# Patient Record
Sex: FEMALE | URBAN - METROPOLITAN AREA
[De-identification: names, ages, dates, MRNs, and addresses within clinical notes are randomized per-mention and may not be internally consistent; named-entity substitution may affect disease eponyms.]

---

## 2018-11-21 ENCOUNTER — APPOINTMENT (RX ONLY)
Dept: URBAN - METROPOLITAN AREA CLINIC 35 | Facility: CLINIC | Age: 48
Setting detail: DERMATOLOGY
End: 2018-11-21

## 2018-11-21 DIAGNOSIS — Z41.9 ENCOUNTER FOR PROCEDURE FOR PURPOSES OTHER THAN REMEDYING HEALTH STATE, UNSPECIFIED: ICD-10-CM

## 2018-11-21 PROCEDURE — ? ADDITIONAL NOTES

## 2018-11-21 PROCEDURE — ? FILLERS

## 2018-11-21 PROCEDURE — ? BOTOX

## 2018-11-21 NOTE — PROCEDURE: BOTOX
Additional Area 2 Units: 38
Consent: Written consent obtained. Risks include but not limited to lid/brow ptosis or drooping, bruising, swelling, diplopia/double vision, temporary effect, incomplete chemical denervation/relaxation of muscles.
Inferior Lateral Orbicularis Oculi Units: 0
Additional Area 6 Location: platysma
Reconstitution Date (Optional): 11/21/2018
Additional Area 2 Location: Crows Feet
Price (Use Numbers Only, No Special Characters Or $): 998
Dilution (U/0.1 Cc): 5
Additional Area 3 Units: 12
Lot #: [id number]
Additional Area 3 Location: Glabella
Post-Care Instructions: Patient instructed to not lie down for 4 hours after injections and limit physical activity for 24 hours.
Expiration Date (Month Year): 10/2020
Detail Level: Detailed
Additional Area 5 Location: perioral
Additional Area 1 Location: Frontalis
Additional Area 4 Location: Bunny lines

## 2019-04-04 ENCOUNTER — APPOINTMENT (RX ONLY)
Dept: URBAN - METROPOLITAN AREA CLINIC 35 | Facility: CLINIC | Age: 49
Setting detail: DERMATOLOGY
End: 2019-04-04

## 2019-04-04 DIAGNOSIS — Z41.9 ENCOUNTER FOR PROCEDURE FOR PURPOSES OTHER THAN REMEDYING HEALTH STATE, UNSPECIFIED: ICD-10-CM

## 2019-04-04 PROCEDURE — ? ADDITIONAL NOTES

## 2019-04-04 PROCEDURE — ? BOTOX

## 2019-04-04 NOTE — PROCEDURE: BOTOX
Additional Area 1 Units: 12
Additional Area 3 Units: 0
Expiration Date (Month Year): 01/2021
Price (Use Numbers Only, No Special Characters Or $): 778
Lot #: [id number]
Additional Area 4 Location: Bunny lines
Additional Area 6 Location: platysma
Reconstitution Date (Optional): 4/4/2019
Consent: Verbal and written informed consent were obtained to include the following risks: pain, swelling, bruising, eyelid or eyebrow droop, and lack of visible improvement of wrinkles in the areas treated.  The skin was cleansed with alcohol. Injections were administered with a 32g needle into the following areas:
Detail Level: Detailed
Dilution (U/0.1 Cc): 5
Additional Area 2 Units: 38
Additional Area 2 Location: Crows Feet
Additional Area 3 Location: Frontalis
Post-Care Instructions: Patient instructed to not lie down for 4 hours after injections and limit physical activity for 24 hours.
Additional Area 5 Location: perioral
Additional Area 1 Location: Glabella

## 2019-08-01 ENCOUNTER — APPOINTMENT (RX ONLY)
Dept: URBAN - METROPOLITAN AREA CLINIC 35 | Facility: CLINIC | Age: 49
Setting detail: DERMATOLOGY
End: 2019-08-01

## 2019-08-01 DIAGNOSIS — Z41.9 ENCOUNTER FOR PROCEDURE FOR PURPOSES OTHER THAN REMEDYING HEALTH STATE, UNSPECIFIED: ICD-10-CM

## 2019-08-01 PROCEDURE — ? ADDITIONAL NOTES

## 2019-08-01 PROCEDURE — ? BOTOX

## 2019-08-01 NOTE — PROCEDURE: BOTOX
Depressor Anguli Oris Units: 0
Lot #: [id number]
Additional Area 3 Location: Frontalis
Additional Area 2 Units: 38
Expiration Date (Month Year): 01/2021
Additional Area 2 Location: Crows Feet
Additional Area 1 Location: Glabella
Detail Level: Detailed
Additional Area 4 Location: Bunny lines
Post-Care Instructions: Patient instructed to not lie down for 4 hours after injections and limit physical activity for 24 hours.
Dilution (U/0.1 Cc): 5
Additional Area 1 Units: 12
Price (Use Numbers Only, No Special Characters Or $): 134
Reconstitution Date (Optional): 8/1/2019
Consent: Verbal and written informed consent were obtained to include the following risks: pain, swelling, bruising, eyelid or eyebrow droop, and lack of visible improvement of wrinkles in the areas treated.  The skin was cleansed with alcohol. Injections were administered with a 32g needle into the following areas:
Additional Area 6 Location: platysma
Additional Area 5 Location: perioral

## 2019-12-05 ENCOUNTER — APPOINTMENT (RX ONLY)
Dept: URBAN - METROPOLITAN AREA CLINIC 35 | Facility: CLINIC | Age: 49
Setting detail: DERMATOLOGY
End: 2019-12-05

## 2019-12-05 DIAGNOSIS — Z41.9 ENCOUNTER FOR PROCEDURE FOR PURPOSES OTHER THAN REMEDYING HEALTH STATE, UNSPECIFIED: ICD-10-CM

## 2019-12-05 PROCEDURE — ? BOTOX

## 2019-12-05 PROCEDURE — ? ADDITIONAL NOTES

## 2019-12-05 NOTE — PROCEDURE: BOTOX
Dilution (U/0.1 Cc): 5
Additional Area 5 Location: perioral
Additional Area 3 Units: 0
Expiration Date (Month Year): 01/2021
Detail Level: Detailed
Post-Care Instructions: Patient instructed to not lie down for 4 hours after injections and limit physical activity for 24 hours.
Additional Area 1 Location: Glabella
Additional Area 1 Units: 12
Additional Area 3 Location: Frontalis
Price (Use Numbers Only, No Special Characters Or $): 047
Additional Area 4 Location: Bunny lines
Lot #: [id number]
Additional Area 6 Location: platysma
Additional Area 2 Units: 38
Additional Area 2 Location: Crows Feet
Reconstitution Date (Optional): 8/1/2019
Consent: Verbal and written informed consent were obtained to include the following risks: pain, swelling, bruising, eyelid or eyebrow droop, and lack of visible improvement of wrinkles in the areas treated.  The skin was cleansed with alcohol. Injections were administered with a 32g needle into the following areas:

## 2020-02-03 NOTE — PROCEDURE: FILLERS
Nasolabial Folds Filler Volume In Cc: 0
Include Cannula Information In Note?: No
Map Statment: See Attach Map for Complete Details
Use Map Statement For Sites (Optional): Yes
Detail Level: Detailed
Cheeks Filler Volume In Cc: 1
Lot #: [id number]
Expiration Date (Month Year): 2019.12.24
Post-Care Instructions: Patient instructed to apply ice to reduce swelling.
Price (Use Numbers Only, No Special Characters Or $): 040
Topical Anesthesia?: 23% lidocaine, 7% tetracaine
Filler: Juvederm Voluma XC
Filler Comments: 0.4cc left cheek, 0.6cc right cheek
Consent: Written consent obtained. Risks include but not limited to bruising, beading, irregular texture, ulceration, infection, allergic reaction, scar formation, incomplete augmentation, temporary nature, procedural pain.
DC instructions

## 2020-04-02 ENCOUNTER — APPOINTMENT (RX ONLY)
Dept: URBAN - METROPOLITAN AREA CLINIC 35 | Facility: CLINIC | Age: 50
Setting detail: DERMATOLOGY
End: 2020-04-02

## 2020-04-02 DIAGNOSIS — Z41.9 ENCOUNTER FOR PROCEDURE FOR PURPOSES OTHER THAN REMEDYING HEALTH STATE, UNSPECIFIED: ICD-10-CM

## 2020-04-02 PROCEDURE — ? ADDITIONAL NOTES

## 2020-04-02 PROCEDURE — ? BOTOX

## 2020-04-02 NOTE — PROCEDURE: BOTOX
Nasal Root Units: 0
Additional Area 3 Location: Frontalis
Additional Area 4 Location: Bunny lines
Additional Area 1 Location: Glabella
Additional Area 1 Units: 12
Additional Area 2 Location: Crows Feet
Consent: Verbal and written informed consent were obtained to include the following risks: pain, swelling, bruising, eyelid or eyebrow droop, and lack of visible improvement of wrinkles in the areas treated.  The skin was cleansed with alcohol. Injections were administered with a 32g needle into the following areas:
Expiration Date (Month Year): 05/22
Additional Area 2 Units: 38
Post-Care Instructions: Patient instructed to not lie down for 4 hours after injections and limit physical activity for 24 hours.
Reconstitution Date (Optional): 4/1/20
Additional Area 5 Location: perioral
Lot #: [id number]
Detail Level: Detailed
Additional Area 6 Location: platysma
Dilution (U/0.1 Cc): 5
Price (Use Numbers Only, No Special Characters Or $): 935

## 2021-02-17 ENCOUNTER — APPOINTMENT (RX ONLY)
Dept: URBAN - METROPOLITAN AREA CLINIC 35 | Facility: CLINIC | Age: 51
Setting detail: DERMATOLOGY
End: 2021-02-17

## 2021-02-17 DIAGNOSIS — Z41.9 ENCOUNTER FOR PROCEDURE FOR PURPOSES OTHER THAN REMEDYING HEALTH STATE, UNSPECIFIED: ICD-10-CM

## 2021-02-17 PROCEDURE — ? FILLERS

## 2021-02-17 PROCEDURE — ? MEDICAL CONSULTATION: FRACTIONAL RESURFACING

## 2021-02-17 PROCEDURE — ? ADDITIONAL NOTES

## 2021-02-17 PROCEDURE — ? BOTOX

## 2021-02-17 PROCEDURE — ? MEDICAL CONSULTATION: FILLERS

## 2021-02-17 ASSESSMENT — LOCATION DETAILED DESCRIPTION DERM
LOCATION DETAILED: RIGHT SUPERIOR CENTRAL MALAR CHEEK
LOCATION DETAILED: LEFT INFERIOR ANTERIOR NECK
LOCATION DETAILED: LEFT SUPERIOR CENTRAL MALAR CHEEK

## 2021-02-17 ASSESSMENT — LOCATION SIMPLE DESCRIPTION DERM
LOCATION SIMPLE: LEFT ANTERIOR NECK
LOCATION SIMPLE: RIGHT CHEEK
LOCATION SIMPLE: LEFT CHEEK

## 2021-02-17 ASSESSMENT — LOCATION ZONE DERM
LOCATION ZONE: NECK
LOCATION ZONE: FACE

## 2021-02-17 NOTE — PROCEDURE: BOTOX
Nasal Root Units: 0
Additional Area 3 Location: Frontalis
Additional Area 4 Location: Bunny lines
Additional Area 1 Location: Glabella
Additional Area 1 Units: 12
Additional Area 2 Location: Crows Feet
Consent: Verbal and written informed consent were obtained to include the following risks: pain, swelling, bruising, eyelid or eyebrow droop, and lack of visible improvement of wrinkles in the areas treated.  The skin was cleansed with alcohol. Injections were administered with a 32g needle into the following areas:
Expiration Date (Month Year): 08/23
Additional Area 2 Units: 38
Post-Care Instructions: Patient instructed to not lie down for 4 hours after injections and limit physical activity for 24 hours.
Reconstitution Date (Optional): 2/17/21
Additional Area 5 Location: perioral
Lot #: [id number]
Detail Level: Detailed
Additional Area 6 Location: platysma
Dilution (U/0.1 Cc): 1.1
Price (Use Numbers Only, No Special Characters Or $): 435

## 2021-02-17 NOTE — PROCEDURE: FILLERS
Additional Area 1 Volume In Cc: 0
Additional Area 1 Location: Oral Commisures
Additional Area 3 Location: Fine lines around mouth
Additional Area 4 Location: Scars
Additional Area 5 Location: Earlobes
Include Cannula Information In Note?: No
Consent: Written consent obtained. Risks include but not limited to bruising, bleeding, blindness, stroke, delayed onset of nodules, irregular texture, ulceration, infection, allergic reaction, scar formation, incomplete augmentation, temporary nature, procedural pain.
Additional Area 2 Location: Border of lips
Price (Use Numbers Only, No Special Characters Or $): 1600
Filler: Juvederm Voluma XC
Detail Level: Detailed
Use Map Statement For Sites (Optional): Yes
Lot #: [id number]
Map Statment: See Attach Map for Complete Details
Temple Hollows Filler Volume In Cc: 2
Post-Care Instructions: Patient instructed to apply ice to reduce swelling.
Expiration Date (Month Year): 2/9/22

## 2021-03-25 ENCOUNTER — APPOINTMENT (RX ONLY)
Dept: URBAN - METROPOLITAN AREA CLINIC 35 | Facility: CLINIC | Age: 51
Setting detail: DERMATOLOGY
End: 2021-03-25

## 2021-03-25 DIAGNOSIS — Z41.9 ENCOUNTER FOR PROCEDURE FOR PURPOSES OTHER THAN REMEDYING HEALTH STATE, UNSPECIFIED: ICD-10-CM

## 2021-03-25 PROCEDURE — ? FILLERS

## 2021-03-25 NOTE — PROCEDURE: FILLERS
Additional Area 1 Volume In Cc: 0
Additional Area 1 Location: Oral Commisures
Additional Area 3 Location: Fine lines around mouth
Additional Area 2 Location: Border of lips
Additional Area 4 Location: Scars
Include Cannula Information In Note?: No
Consent: Written consent obtained. Risks include but not limited to bruising, bleeding, blindness, stroke, delayed onset of nodules, irregular texture, ulceration, infection, allergic reaction, scar formation, incomplete augmentation, temporary nature, procedural pain.
Additional Area 5 Location: Earlobes
Price (Use Numbers Only, No Special Characters Or $): 2050
Detail Level: Detailed
Expiration Date (Month Year): 12/27/21
Include Cannula Information In Note?: Yes
Filler: Juvederm Voluma XC
Additional Area 3 Volume In Cc: 0.5
Lot #: [id number]
Map Statment: See Attach Map for Complete Details
Filler Comments: 0.5 cc per side with needle to upper temple \\n0.5 cc per side with cannula to lower temple
Filler: Juvederm Volbella XC
Lot #: [id number]
Post-Care Instructions: Patient instructed to apply ice to reduce swelling.

## 2021-05-27 ENCOUNTER — APPOINTMENT (RX ONLY)
Dept: URBAN - METROPOLITAN AREA CLINIC 35 | Facility: CLINIC | Age: 51
Setting detail: DERMATOLOGY
End: 2021-05-27

## 2021-05-27 DIAGNOSIS — Z41.9 ENCOUNTER FOR PROCEDURE FOR PURPOSES OTHER THAN REMEDYING HEALTH STATE, UNSPECIFIED: ICD-10-CM

## 2021-05-27 PROCEDURE — ? ADDITIONAL NOTES

## 2021-05-27 PROCEDURE — ? BOTOX

## 2021-05-27 PROCEDURE — ? FILLERS

## 2021-05-27 NOTE — PROCEDURE: FILLERS
Additional Area 2 Volume In Cc: 0
Additional Area 4 Location: Scars
Additional Area 1 Location: Oral Commisures
Include Cannula Information In Note?: No
Lot #: [id number]
Additional Area 3 Location: Fine lines around mouth
Post-Care Instructions: Patient instructed to apply ice to reduce swelling.
Additional Area 5 Location: Earlobes
Additional Area 2 Location: Border of lips
Price (Use Numbers Only, No Special Characters Or $): 080
Consent: Written consent obtained. Risks include but not limited to bruising, bleeding, blindness, stroke, delayed onset of nodules, irregular texture, ulceration, infection, allergic reaction, scar formation, incomplete augmentation, temporary nature, procedural pain.
Use Map Statement For Sites (Optional): Yes
Expiration Date (Month Year): 3/26/22
Detail Level: Detailed
Filler: Juvederm Voluma XC
Cheeks Filler Volume In Cc: 1
Map Statment: See Attach Map for Complete Details

## 2021-05-27 NOTE — PROCEDURE: BOTOX
Nasal Root Units: 0
Additional Area 3 Location: Frontalis
Additional Area 4 Location: Bunny lines
Additional Area 1 Location: Glabella
Additional Area 1 Units: 12
Additional Area 2 Location: Crows Feet
Consent: Verbal and written informed consent were obtained to include the following risks: pain, swelling, bruising, eyelid or eyebrow droop, and lack of visible improvement of wrinkles in the areas treated.  The skin was cleansed with alcohol. Injections were administered with a 32g needle into the following areas:
Expiration Date (Month Year): 08/23
Additional Area 2 Units: 38
Post-Care Instructions: Patient instructed to not lie down for 4 hours after injections and limit physical activity for 24 hours.
Reconstitution Date (Optional): 5/27/21
Additional Area 5 Location: perioral
Lot #: [id number]
Detail Level: Detailed
Additional Area 6 Location: platysma
Dilution (U/0.1 Cc): 1.1
Price (Use Numbers Only, No Special Characters Or $): 921

## 2021-09-16 ENCOUNTER — APPOINTMENT (RX ONLY)
Dept: URBAN - METROPOLITAN AREA CLINIC 35 | Facility: CLINIC | Age: 51
Setting detail: DERMATOLOGY
End: 2021-09-16

## 2021-09-16 DIAGNOSIS — Z41.9 ENCOUNTER FOR PROCEDURE FOR PURPOSES OTHER THAN REMEDYING HEALTH STATE, UNSPECIFIED: ICD-10-CM

## 2021-09-16 PROCEDURE — ? BOTOX

## 2021-09-16 PROCEDURE — ? FILLERS

## 2021-09-16 PROCEDURE — ? ADDITIONAL NOTES

## 2021-09-16 NOTE — PROCEDURE: FILLERS
Marionette Lines Filler Volume In Cc: 0
Additional Area 4 Location: Scars
Include Cannula Information In Note?: No
Additional Area 3 Location: Fine lines around mouth
Expiration Date (Month Year): 8/25/22
Additional Area 5 Location: Earlobes
Additional Area 2 Location: Border of lips
Map Statment: See Attach Map for Complete Details
Nasolabial Folds Filler Volume In Cc: 0.2
Detail Level: Detailed
Filler Comments: 0.5 cc marionette and oral commisures \\n\\n0.3 cc of Juvederm Ultra XC was blended 1:1 with lidocaine no epinephrine and stranded to fine lines around mouth
Filler: Juvederm Voluma XC
Additional Area 1 Location: Oral Commisures
Filler Comments: Fanning 0.5 cc per side with cannula
Lot #: [id number]
Use Map Statement For Sites (Optional): Yes
Expiration Date (Month Year): 4/30/22
Filler: Juvederm Ultra XC
Lot #: [id number]
Post-Care Instructions: Patient instructed to apply ice to reduce swelling.
Price (Use Numbers Only, No Special Characters Or $): 1400
Consent: Written consent obtained. Risks include but not limited to bruising, bleeding, blindness, stroke, delayed onset of nodules, irregular texture, ulceration, infection, allergic reaction, scar formation, incomplete augmentation, temporary nature, procedural pain.

## 2021-09-16 NOTE — PROCEDURE: BOTOX
Nasal Root Units: 0
Additional Area 3 Location: Frontalis
Additional Area 4 Location: Bunny lines
Additional Area 1 Location: Glabella
Additional Area 1 Units: 12
Additional Area 2 Location: Crows Feet
Consent: Verbal and written informed consent were obtained to include the following risks: pain, swelling, bruising, eyelid or eyebrow droop, and lack of visible improvement of wrinkles in the areas treated.  The skin was cleansed with alcohol. Injections were administered with a 32g needle into the following areas:
Expiration Date (Month Year): 11/23
Additional Area 2 Units: 38
Post-Care Instructions: Patient instructed to not lie down for 4 hours after injections and limit physical activity for 24 hours.
Reconstitution Date (Optional): 9/16/21
Additional Area 5 Location: perioral
Lot #: [id number]
Detail Level: Detailed
Additional Area 6 Location: platysma
Dilution (U/0.1 Cc): 1.1
Price (Use Numbers Only, No Special Characters Or $): 038

## 2021-12-16 ENCOUNTER — APPOINTMENT (RX ONLY)
Dept: URBAN - METROPOLITAN AREA CLINIC 35 | Facility: CLINIC | Age: 51
Setting detail: DERMATOLOGY
End: 2021-12-16

## 2021-12-16 DIAGNOSIS — Z41.9 ENCOUNTER FOR PROCEDURE FOR PURPOSES OTHER THAN REMEDYING HEALTH STATE, UNSPECIFIED: ICD-10-CM

## 2021-12-16 PROCEDURE — ? ADDITIONAL NOTES

## 2021-12-16 PROCEDURE — ? BOTOX

## 2021-12-16 NOTE — PROCEDURE: BOTOX
Nasal Root Units: 0
Additional Area 3 Location: Frontalis
Additional Area 4 Location: Bunny lines
Additional Area 1 Location: Glabella
Additional Area 1 Units: 12
Additional Area 2 Location: Crows Feet
Consent: Verbal and written informed consent were obtained to include the following risks: pain, swelling, bruising, eyelid or eyebrow droop, and lack of visible improvement of wrinkles in the areas treated.  The skin was cleansed with alcohol. Injections were administered with a 32g needle into the following areas:
Expiration Date (Month Year): 4/24
Additional Area 2 Units: 38
Post-Care Instructions: Patient instructed to not lie down for 4 hours after injections and limit physical activity for 24 hours.
Reconstitution Date (Optional): 12/16/21
Additional Area 5 Location: perioral
Lot #: [id number]
Detail Level: Detailed
Additional Area 6 Location: platysma
Dilution (U/0.1 Cc): 1.1
Price (Use Numbers Only, No Special Characters Or $): 004

## 2022-03-22 ENCOUNTER — APPOINTMENT (RX ONLY)
Dept: URBAN - METROPOLITAN AREA CLINIC 35 | Facility: CLINIC | Age: 52
Setting detail: DERMATOLOGY
End: 2022-03-22

## 2022-03-22 DIAGNOSIS — Z41.9 ENCOUNTER FOR PROCEDURE FOR PURPOSES OTHER THAN REMEDYING HEALTH STATE, UNSPECIFIED: ICD-10-CM

## 2022-03-22 PROCEDURE — ? ADDITIONAL NOTES

## 2022-03-22 PROCEDURE — ? BOTOX

## 2022-03-22 NOTE — PROCEDURE: BOTOX
Nasal Root Units: 0
Additional Area 3 Location: Frontalis
Additional Area 4 Location: Bunny lines
Additional Area 1 Location: Glabella
Additional Area 1 Units: 12
Additional Area 2 Location: Crows Feet
Consent: Verbal and written informed consent were obtained to include the following risks: pain, swelling, bruising, eyelid or eyebrow droop, and lack of visible improvement of wrinkles in the areas treated.  The skin was cleansed with alcohol. Injections were administered with a 32g needle into the following areas:
Expiration Date (Month Year): 5/24
Additional Area 2 Units: 38
Post-Care Instructions: Patient instructed to not lie down for 4 hours after injections and limit physical activity for 24 hours.
Reconstitution Date (Optional): 3/22/22
Additional Area 5 Location: perioral
Lot #: [id number]
Detail Level: Detailed
Additional Area 6 Location: platysma
Dilution (U/0.1 Cc): 1.1
Price (Use Numbers Only, No Special Characters Or $): 380

## 2022-07-22 ENCOUNTER — APPOINTMENT (RX ONLY)
Dept: URBAN - METROPOLITAN AREA CLINIC 35 | Facility: CLINIC | Age: 52
Setting detail: DERMATOLOGY
End: 2022-07-22

## 2022-07-22 DIAGNOSIS — Z41.9 ENCOUNTER FOR PROCEDURE FOR PURPOSES OTHER THAN REMEDYING HEALTH STATE, UNSPECIFIED: ICD-10-CM

## 2022-07-22 PROCEDURE — ? ADDITIONAL NOTES

## 2022-07-22 PROCEDURE — ? BOTOX

## 2022-07-22 NOTE — PROCEDURE: BOTOX
Nasal Root Units: 0
Additional Area 3 Location: Frontalis
Additional Area 4 Location: Bunny lines
Additional Area 1 Location: Glabella
Additional Area 1 Units: 12
Additional Area 2 Location: Crows Feet
Consent: Verbal and written informed consent were obtained to include the following risks: pain, swelling, bruising, eyelid or eyebrow droop, and lack of visible improvement of wrinkles in the areas treated.  The skin was cleansed with alcohol. Injections were administered with a 32g needle into the following areas:
Expiration Date (Month Year): 5/24
Additional Area 2 Units: 38
Post-Care Instructions: Patient instructed to not lie down for 4 hours after injections and limit physical activity for 24 hours.
Reconstitution Date (Optional): 3/22/22
Additional Area 5 Location: perioral
Lot #: [id number]
Detail Level: Detailed
Additional Area 6 Location: platysma
Dilution (U/0.1 Cc): 1.1
Price (Use Numbers Only, No Special Characters Or $): 041

## 2022-10-19 ENCOUNTER — APPOINTMENT (RX ONLY)
Dept: URBAN - METROPOLITAN AREA CLINIC 35 | Facility: CLINIC | Age: 52
Setting detail: DERMATOLOGY
End: 2022-10-19

## 2022-10-19 DIAGNOSIS — Z41.9 ENCOUNTER FOR PROCEDURE FOR PURPOSES OTHER THAN REMEDYING HEALTH STATE, UNSPECIFIED: ICD-10-CM

## 2022-10-19 PROCEDURE — ? BOTOX

## 2022-10-19 PROCEDURE — ? ADDITIONAL NOTES

## 2022-10-19 PROCEDURE — ? FILLERS

## 2022-10-19 NOTE — PROCEDURE: BOTOX
Nasal Root Units: 0
Additional Area 3 Location: Frontalis
Additional Area 4 Location: Bunny lines
Additional Area 1 Location: Glabella
Additional Area 1 Units: 12
Additional Area 2 Location: Crows Feet
Consent: Verbal and written informed consent were obtained to include the following risks: pain, swelling, bruising, eyelid or eyebrow droop, and lack of visible improvement of wrinkles in the areas treated.  The skin was cleansed with alcohol. Injections were administered with a 32g needle into the following areas:
Expiration Date (Month Year): 5/25
Additional Area 2 Units: 38
Post-Care Instructions: Patient instructed to not lie down for 4 hours after injections and limit physical activity for 24 hours.
Reconstitution Date (Optional): 3/22/22
Additional Area 5 Location: perioral
Lot #: [id number]
Detail Level: Detailed
Additional Area 6 Location: platysma
Dilution (U/0.1 Cc): 1.1
Price (Use Numbers Only, No Special Characters Or $): 669

## 2022-10-19 NOTE — PROCEDURE: FILLERS
Marionette Lines Filler Volume In Cc: 0
Additional Area 4 Location: Scars
Include Cannula Information In Note?: No
Additional Area 3 Location: Fine lines around mouth
Expiration Date (Month Year): 4/29/2023
Additional Area 5 Location: Earlobes
Additional Area 2 Location: Border of lips
Map Statment: See Attach Map for Complete Details
Detail Level: Detailed
Filler: Juvederm Ultra XC
Additional Area 1 Location: Oral Commisures
Filler Comments: 0.55cc syringe \\nFine lines around mouth
Lot #: [id number]
Use Map Statement For Sites (Optional): Yes
Expiration Date (Month Year): 4/30/22
Lot #: [id number]
Post-Care Instructions: Patient instructed to apply ice to reduce swelling.
Price (Use Numbers Only, No Special Characters Or $): 450
Consent: Written consent obtained. Risks include but not limited to bruising, bleeding, blindness, stroke, delayed onset of nodules, irregular texture, ulceration, infection, allergic reaction, scar formation, incomplete augmentation, temporary nature, procedural pain.
Aspiration Statement: Aspiration was performed prior to injecting site with filler.